# Patient Record
Sex: FEMALE | URBAN - METROPOLITAN AREA
[De-identification: names, ages, dates, MRNs, and addresses within clinical notes are randomized per-mention and may not be internally consistent; named-entity substitution may affect disease eponyms.]

---

## 2022-09-13 ENCOUNTER — APPOINTMENT (RX ONLY)
Dept: URBAN - METROPOLITAN AREA CLINIC 354 | Facility: CLINIC | Age: 52
Setting detail: DERMATOLOGY
End: 2022-09-13

## 2022-09-13 PROBLEM — C44.321 SQUAMOUS CELL CARCINOMA OF SKIN OF NOSE: Status: ACTIVE | Noted: 2022-09-13

## 2022-09-13 PROCEDURE — 99202 OFFICE O/P NEW SF 15 MIN: CPT

## 2022-09-13 PROCEDURE — ? ADDITIONAL NOTES

## 2022-09-13 PROCEDURE — ? SURGICAL DECISION MAKING

## 2022-09-13 PROCEDURE — ? COUNSELING

## 2022-09-13 NOTE — PROCEDURE: ADDITIONAL NOTES
Additional Notes: Dr. Shelby Kaplan and I both recommend Mohs surgery as best treatment option due to cure rate and best cosmetic healing outcome. Pt inquired about the possibility of superficial radiation therapy. Dr. Kaplan was consulted and did not recommend treating with radiation due to patient’s age and high likelihood of a poor cosmetic healing outcome.
Detail Level: Simple
Render Risk Assessment In Note?: no

## 2022-09-13 NOTE — HPI: SKIN LESION (SQUAMOUS CELL CARCINOMA)
Is This A New Presentation, Or A Follow-Up?: Squamous Cell Carcinoma
Additional History: Pt states she went to a different dermatologist and they did a bx and it confirmed scc. They recommended Mohs but she wanted a second opinion. Pt wanted to know if she is a candidate for superficial radiation therapy.

## 2022-09-13 NOTE — PROCEDURE: SURGICAL DECISION MAKING
Initial Decision For Surgery?: Yes
Discussion: Risk, benefits, and alternatives to treatment discussed today to include bleeding, scarring, bruising, and recurrence.
Risk Assessment Explanation (Does Not Render In The Note): Clinical determination of the probability and/or consequences of an event, such as surgery. Clinical assessment of the level of risk is affected by the nature of the event under consideration for the patient. Modifier 57 is used to indicate an Evaluation and Management (E/M) service resulted in the initial decision to perform surgery either the day before a major surgery (90 day global) or the day of a major surgery.